# Patient Record
Sex: MALE | Race: WHITE | Employment: UNEMPLOYED | URBAN - METROPOLITAN AREA
[De-identification: names, ages, dates, MRNs, and addresses within clinical notes are randomized per-mention and may not be internally consistent; named-entity substitution may affect disease eponyms.]

---

## 2021-08-13 ENCOUNTER — HOSPITAL ENCOUNTER (INPATIENT)
Age: 31
LOS: 4 days | Discharge: HOME | DRG: 751 | End: 2021-08-17
Attending: EMERGENCY MEDICINE | Admitting: PSYCHIATRY & NEUROLOGY
Payer: MEDICAID

## 2021-08-13 DIAGNOSIS — R45.851 SUICIDAL IDEATION: Primary | ICD-10-CM

## 2021-08-13 PROBLEM — F32.A DEPRESSION: Status: ACTIVE | Noted: 2021-08-13

## 2021-08-13 LAB
A/G RATIO: 1.3 (ref 1.1–2.2)
ACETAMINOPHEN LEVEL: <5 UG/ML (ref 10–30)
ALBUMIN SERPL-MCNC: 4.4 G/DL (ref 3.4–5)
ALP BLD-CCNC: 77 U/L (ref 40–129)
ALT SERPL-CCNC: 18 U/L (ref 10–40)
AMPHETAMINE SCREEN, URINE: ABNORMAL
ANION GAP SERPL CALCULATED.3IONS-SCNC: 14 MMOL/L (ref 3–16)
AST SERPL-CCNC: 16 U/L (ref 15–37)
BARBITURATE SCREEN URINE: ABNORMAL
BASOPHILS ABSOLUTE: 0.2 K/UL (ref 0–0.2)
BASOPHILS RELATIVE PERCENT: 1.2 %
BENZODIAZEPINE SCREEN, URINE: ABNORMAL
BILIRUB SERPL-MCNC: 0.3 MG/DL (ref 0–1)
BUN BLDV-MCNC: 13 MG/DL (ref 7–20)
CALCIUM SERPL-MCNC: 9.2 MG/DL (ref 8.3–10.6)
CANNABINOID SCREEN URINE: POSITIVE
CHLORIDE BLD-SCNC: 104 MMOL/L (ref 99–110)
CO2: 20 MMOL/L (ref 21–32)
COCAINE METABOLITE SCREEN URINE: ABNORMAL
CREAT SERPL-MCNC: 0.7 MG/DL (ref 0.9–1.3)
EOSINOPHILS ABSOLUTE: 0.4 K/UL (ref 0–0.6)
EOSINOPHILS RELATIVE PERCENT: 2.5 %
ETHANOL: 12 MG/DL (ref 0–0.08)
GFR AFRICAN AMERICAN: >60
GFR NON-AFRICAN AMERICAN: >60
GLOBULIN: 3.3 G/DL
GLUCOSE BLD-MCNC: 104 MG/DL (ref 70–99)
HCT VFR BLD CALC: 42.9 % (ref 40.5–52.5)
HEMOGLOBIN: 14.4 G/DL (ref 13.5–17.5)
LYMPHOCYTES ABSOLUTE: 4.6 K/UL (ref 1–5.1)
LYMPHOCYTES RELATIVE PERCENT: 30 %
Lab: ABNORMAL
MCH RBC QN AUTO: 28.1 PG (ref 26–34)
MCHC RBC AUTO-ENTMCNC: 33.6 G/DL (ref 31–36)
MCV RBC AUTO: 83.7 FL (ref 80–100)
METHADONE SCREEN, URINE: ABNORMAL
MONOCYTES ABSOLUTE: 1 K/UL (ref 0–1.3)
MONOCYTES RELATIVE PERCENT: 6.7 %
NEUTROPHILS ABSOLUTE: 9.1 K/UL (ref 1.7–7.7)
NEUTROPHILS RELATIVE PERCENT: 59.6 %
OPIATE SCREEN URINE: ABNORMAL
OXYCODONE URINE: ABNORMAL
PDW BLD-RTO: 15 % (ref 12.4–15.4)
PH UA: 5
PHENCYCLIDINE SCREEN URINE: ABNORMAL
PLATELET # BLD: 275 K/UL (ref 135–450)
PMV BLD AUTO: 8.6 FL (ref 5–10.5)
POTASSIUM REFLEX MAGNESIUM: 4.2 MMOL/L (ref 3.5–5.1)
PROPOXYPHENE SCREEN: ABNORMAL
RBC # BLD: 5.12 M/UL (ref 4.2–5.9)
SALICYLATE, SERUM: <0.3 MG/DL (ref 15–30)
SARS-COV-2, NAAT: NOT DETECTED
SODIUM BLD-SCNC: 138 MMOL/L (ref 136–145)
TOTAL PROTEIN: 7.7 G/DL (ref 6.4–8.2)
WBC # BLD: 15.3 K/UL (ref 4–11)

## 2021-08-13 PROCEDURE — 1240000000 HC EMOTIONAL WELLNESS R&B

## 2021-08-13 PROCEDURE — 80143 DRUG ASSAY ACETAMINOPHEN: CPT

## 2021-08-13 PROCEDURE — 99285 EMERGENCY DEPT VISIT HI MDM: CPT

## 2021-08-13 PROCEDURE — 6370000000 HC RX 637 (ALT 250 FOR IP): Performed by: PSYCHIATRY & NEUROLOGY

## 2021-08-13 PROCEDURE — 80053 COMPREHEN METABOLIC PANEL: CPT

## 2021-08-13 PROCEDURE — 80179 DRUG ASSAY SALICYLATE: CPT

## 2021-08-13 PROCEDURE — 85025 COMPLETE CBC W/AUTO DIFF WBC: CPT

## 2021-08-13 PROCEDURE — 99223 1ST HOSP IP/OBS HIGH 75: CPT | Performed by: PSYCHIATRY & NEUROLOGY

## 2021-08-13 PROCEDURE — 80307 DRUG TEST PRSMV CHEM ANLYZR: CPT

## 2021-08-13 PROCEDURE — 99221 1ST HOSP IP/OBS SF/LOW 40: CPT | Performed by: PHYSICIAN ASSISTANT

## 2021-08-13 PROCEDURE — 82077 ASSAY SPEC XCP UR&BREATH IA: CPT

## 2021-08-13 PROCEDURE — 87635 SARS-COV-2 COVID-19 AMP PRB: CPT

## 2021-08-13 RX ORDER — ACETAMINOPHEN 325 MG/1
650 TABLET ORAL EVERY 4 HOURS PRN
Status: DISCONTINUED | OUTPATIENT
Start: 2021-08-13 | End: 2021-08-17 | Stop reason: HOSPADM

## 2021-08-13 RX ORDER — TRAZODONE HYDROCHLORIDE 100 MG/1
100 TABLET ORAL NIGHTLY
Status: DISCONTINUED | OUTPATIENT
Start: 2021-08-13 | End: 2021-08-17 | Stop reason: HOSPADM

## 2021-08-13 RX ORDER — OLANZAPINE 10 MG/1
10 INJECTION, POWDER, LYOPHILIZED, FOR SOLUTION INTRAMUSCULAR EVERY 4 HOURS PRN
Status: DISCONTINUED | OUTPATIENT
Start: 2021-08-13 | End: 2021-08-17 | Stop reason: HOSPADM

## 2021-08-13 RX ORDER — OLANZAPINE 5 MG/1
5 TABLET ORAL EVERY 4 HOURS PRN
Status: DISCONTINUED | OUTPATIENT
Start: 2021-08-13 | End: 2021-08-17 | Stop reason: HOSPADM

## 2021-08-13 RX ORDER — TRAZODONE HYDROCHLORIDE 50 MG/1
50 TABLET ORAL NIGHTLY PRN
Status: DISCONTINUED | OUTPATIENT
Start: 2021-08-13 | End: 2021-08-17 | Stop reason: HOSPADM

## 2021-08-13 RX ORDER — HYDROXYZINE PAMOATE 50 MG/1
50 CAPSULE ORAL 3 TIMES DAILY PRN
Status: DISCONTINUED | OUTPATIENT
Start: 2021-08-13 | End: 2021-08-17 | Stop reason: HOSPADM

## 2021-08-13 RX ORDER — IBUPROFEN 400 MG/1
400 TABLET ORAL EVERY 6 HOURS PRN
Status: DISCONTINUED | OUTPATIENT
Start: 2021-08-13 | End: 2021-08-17 | Stop reason: HOSPADM

## 2021-08-13 RX ADMIN — TRAZODONE HYDROCHLORIDE 100 MG: 100 TABLET ORAL at 21:36

## 2021-08-13 RX ADMIN — SERTRALINE HYDROCHLORIDE 50 MG: 50 TABLET ORAL at 21:37

## 2021-08-13 ASSESSMENT — SLEEP AND FATIGUE QUESTIONNAIRES
RESTFUL SLEEP: NO
DIFFICULTY FALLING ASLEEP: YES
DIFFICULTY STAYING ASLEEP: YES
DIFFICULTY ARISING: NO
DO YOU USE A SLEEP AID: NO
AVERAGE NUMBER OF SLEEP HOURS: 2
SLEEP PATTERN: DIFFICULTY FALLING ASLEEP;DISTURBED/INTERRUPTED SLEEP;INSOMNIA
DO YOU HAVE DIFFICULTY SLEEPING: YES

## 2021-08-13 ASSESSMENT — PATIENT HEALTH QUESTIONNAIRE - PHQ9
SUM OF ALL RESPONSES TO PHQ QUESTIONS 1-9: 25
SUM OF ALL RESPONSES TO PHQ QUESTIONS 1-9: 27

## 2021-08-13 ASSESSMENT — LIFESTYLE VARIABLES: HISTORY_ALCOHOL_USE: YES
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
## 2021-08-13 NOTE — PROGRESS NOTES
Ambrocio Noguera ...   [facility]  Admission Note     Admission Type:   Admission Type: Voluntary    Reason for admission:  Reason for Admission: SI had a gun was going to kill himselfa friend found himandgot him to the hospital    PATIENT STRENGTHS:  Strengths: Employment, No significant Physical Illness    Patient Strengths and Limitations:  Limitations: Tendency to isolate self, External locus of control, Difficulty problem solving/relies on others to help solve problems, Difficult relationships / poor social skills, Hopeless about future, General negative or hopeless attitude about future/recovery    Addictive Behavior:   Addictive Behavior  In the past 3 months, have you felt or has someone told you that you have a problem with:  : None  Do you have a history of Chemical Use?: Yes  Do you have a history of Alcohol Use?: Yes (stopped drinking 7 years ago)  Do you have a history of Street Drug Abuse?: Yes  Histroy of Prescripton Drug Abuse?: Yes (opiates stopped 6 years ago)    Medical Problems:   History reviewed. No pertinent past medical history. Status EXAM:  Status and Exam  Normal: No  Facial Expression: Avoids Gaze  Affect: Blunt  Level of Consciousness: Lethargic  Mood:Normal: No  Mood: Depressed  Motor Activity:Normal: No  Motor Activity: Decreased  Interview Behavior: Cooperative  Preception: [place] to Person, Shyrl Plump to Time, [place] to Place, [place] to Situation  Attention:Normal: Yes  Attention: Unable to Concentrate  Thought Processes: Other(See comment) (WNL)  Thought Content:Normal: Yes  Hallucinations: None  Delusions: No  Memory:Normal: Yes  Insight and Judgment: No  Insight and Judgment: Poor Judgment, Poor Insight  Present Suicidal Ideation: No  Present Homicidal Ideation: No    Tobacco Screening:  Practical Counseling, on admission, juanjo X, if applicable and completed (first 3 are required if patient doesn't refuse):             ( X)  Recognizing danger situations (included triggers and roadblocks)                    ( X)  Coping skills (new ways to manage stress, exercise, relaxation techniques, changing routine, distraction)                                                           (X )  Basic information about quitting (benefits of quitting, techniques in how to quit, available resources  ( ) Referral for counseling faxed to Jon                                           ( ) Patient refused counseling  ( ) Patient has not smoked in the last 30 days    Metabolic Screening:    No results found for: LABA1C    No results found for: CHOL  No results found for: TRIG  No results found for: HDL  No components found for: LDLCAL  No results found for: LABVLDL      Body mass index is 38.35 kg/m². BP Readings from Last 2 Encounters:   08/13/21 119/72           Pt admitted with followings belongings:  Dentures: None  Vision - Corrective Lenses: None  Hearing Aid: None  Jewelry: None  Body Piercings Removed: N/A  Clothing: Pants, Shirt, Other (Comment) (belt)  Were All Patient Medications Collected?: Not Applicable  Other Valuables: Cell phone      Patient oriented to surroundings and program expectations and copy of patient rights given. Received admission packet:  YES. Consents reviewed, signed YES. Refused NO. Patient verbalize understanding:  yes.   Patient education on precautions: yes                   Isamar Sol RN

## 2021-08-13 NOTE — PLAN OF CARE
Problem: Depressive Behavior With or Without Suicide Precautions:  Goal: Able to verbalize and/or display a decrease in depressive symptoms  Description: Able to verbalize and/or display a decrease in depressive symptoms  Outcome: Ongoing     Problem: Depressive Behavior With or Without Suicide Precautions:  Goal: Ability to disclose and discuss suicidal ideas will improve  Description: Ability to disclose and discuss suicidal ideas will improve  Outcome: Ongoing     Problem: Depressive Behavior With or Without Suicide Precautions:  Goal: Absence of self-harm  Description: Absence of self-harm  Outcome: Ongoing   Patient verbalized that he still wishes that his friend had not interrupted his SI attempt. Patient did not verbalize thoughts to harm himself at this time. Patient stated that he has had a \"bad year\". He verbalized that his brother , grandpa  and his grandmother is sick. He verbalized that he has been getting more depressed to the point of not going to his job. Patient verbalized that today was the most he had talked about his problems so far. Patient encouragd to be on the milieu and try to spend less time in his room. Patient showered and was briefly on the milieu, no interaction with peers.
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
## 2021-08-13 NOTE — ED NOTES
Level of Care Disposition: Admit  Patient was seen by ED provider and [facility] staff. The case presented to psychiatric provider on-call, Dr. Zara Adhikari. Based on the ED evaluation and information presented to the provider by Shivani Burdick, it is the recommendation of the on call psychiatric provider that inpatient hospitalization is the least restrictive environment for the patient at this time. The patient will be admitted to the inpatient unit. Admitting provider did not order suicide precautions based on patient verbally lorna for safety while inpatient.   States he does not intend to harm self while at the hospital..           Sherrie Mesa [place]  08/13/21 8335

## 2021-08-13 NOTE — ED NOTES
Telephone report provided to Nithya Pelletier RN. Patient resting in assigned room, no outward s/s distress noted. Monitored for safety.      Hamida Johnson RN  08/13/21 8697

## 2021-08-13 NOTE — ED PROVIDER NOTES
[address] [facility]  EMERGENCY DEPARTMENT ENCOUNTER      Pt Name: Pao Knight  MRN: [id number]  [place]am[place] 1990  Date of evaluation: 8/13/2021  Provider: Luisana Regan MD    CHIEF COMPLAINT       Chief Complaint   Patient presents with    Psychiatric Evaluation     states hearing voices and they are telling him to kill himself or that everybody will be better off if he were dead. states has been hearing them for aprox 10 years but has never seen anyone for this         HISTORY OF PRESENT ILLNESS   (Location/Symptom, Timing/Onset, Context/Setting, Quality, Duration, Modifying Factors, Severity)  Note limiting factors. Pao Knight is a 26 y.o. male with past medical history of no significant illness here today for suicidal ideation    The patient states that he has a longtime history of feeling depressed and feeling suicidal.  He states he has tried cutting himself in the past but it is always been superficial and is never needed to her receive attention. States he is never seen a physician for this. He has never been formally diagnosed with any psychiatric illness but states he is always felt depressed. He notes that the symptoms have been coming more increased as of late. He states he is now hearing voices that are telling him to kill himself. He notes that he has a gun at home and was going to shoot himself tonight until he was stopped by his friend. States he feels very tired not been sleeping well. Denies any ingestions. Admits to smoking tobacco products and marijuana but no other substance abuse. Denies any headache. No numbness, tingling or weakness. No recent personality changes. States he has had the symptoms for nearly his whole life and they have slowly been worsening    HPI    Nursing Notes were reviewed.     REVIEW OF SYSTEMS    (2-9 systems for level 4, 10 or more for level 5)     Review of Systems    Please see HPI for pertinent positive and negative review of system findings. A full 10 system ROS was performed and otherwise negative. PAST MEDICAL HISTORY   History reviewed. No pertinent past medical history. SURGICAL HISTORY       Past Surgical History:   Procedure Laterality Date    ADENOIDECTOMY      APPENDECTOMY      TONSILLECTOMY           CURRENT MEDICATIONS       Previous Medications    No medications on file       ALLERGIES     Patient has no known allergies. FAMILY HISTORY     History reviewed. No pertinent family history. SOCIAL HISTORY       Social History     Socioeconomic History    Marital status:      Spouse name: None    Number of children: None    Years of education: None    Highest education level: None   Occupational History    None   Tobacco Use    Smoking status: Current Every Day Smoker     Packs/day: 1.00   Substance and Sexual Activity    Alcohol use: Yes     Comment: approx every 6 months    Drug use: Yes     Types: Marijuana     Comment: last smoked approx 48 hours ago    Sexual activity: None   Other Topics Concern    None   Social History Narrative    None     Social Determinants of Health     Financial Resource Strain:     Difficulty of Paying Living Expenses:    Food Insecurity:     Worried About Running Out of Food in the Last Year:     Ran Out of Food in the Last Year:    Transportation Needs:     Lack of Transportation (Medical):      Lack of Transportation (Non-Medical):    Physical Activity:     Days of Exercise per Week:     Minutes of Exercise per Session:    Stress:     Feeling of Stress :    Social Connections:     Frequency of Communication with Friends and Family:     Frequency of Social Gatherings with Friends and Family:     Attends Anabaptism Services:     Active Member of Clubs or Organizations:     Attends Club or Organization Meetings:     Marital Status:    Intimate Partner Violence:     Fear of Current or Ex-Partner:     Emotionally Abused:     Physically Abused:     Sexually Abused:        SCREENINGS               PHYSICAL EXAM    (up to 7 for level 4, 8 or more for level 5)     ED Triage Vitals [08/13/21 0215]   BP Temp Temp Source Pulse Resp SpO2 Height Weight   (!) 145/92 97.1 °F (36.2 °C) Oral 64 16 99 % 5' 11\" (1.803 m) 275 lb (124.7 kg)       Physical Exam    General appearance:  Cooperative. Tearful  Skin:  Warm. Dry. Eye:  Extraocular movements intact. Ears, nose, mouth and throat:  Oral mucosa moist,  Neck:  Trachea midline. Heart:  Regular rate and rhythm  Perfusion:  intact  Respiratory:  Lungs clear to auscultation bilaterally. Respirations nonlabored. Abdominal:   Non distended. Nontender  Neurological:  Alert and oriented x 3. Moves all extremities spontaneously  Musculoskeletal:   Normal ROM, no deformities          Psychiatric: Tearful with depressed mood. Endorses auditory hallucinations      DIAGNOSTIC RESULTS       Labs Reviewed   COVID-19, RAPID   COVID-19, RAPID   CBC WITH AUTO DIFFERENTIAL   COMPREHENSIVE METABOLIC PANEL W/ REFLEX TO MG FOR LOW K   URINE DRUG SCREEN   SALICYLATE LEVEL   ACETAMINOPHEN LEVEL   ETHANOL       Interpretation per the Radiologist below, if obtained/available at the time of this note:    No orders to display       All other labs/imaging were within normal range or not returned as of this dictation. EMERGENCY DEPARTMENT COURSE and DIFFERENTIAL DIAGNOSIS/MDM:   Vitals:    Vitals:    08/13/21 0215   BP: (!) 145/92   Pulse: 64   Resp: 16   Temp: 97.1 °F (36.2 °C)   TempSrc: Oral   SpO2: 99%   Weight: 275 lb (124.7 kg)   Height: 5' 11\" (1.803 m)       Patient presents emergency department today with intermittent auditory hallucinations, worsening depression and thoughts of killing himself. States he was going to shoot himself with a gun. He has no formal psychiatric history but states he has been dealing with this for his entire life.   Nonfocal neurologic exam.  No complaints of headache or recent personality changes. Low suspicion for intracranial pathology. Laboratory work-up was unremarkable here do feel the patient is medically clear for psychiatric evaluation. Given his active suicidal ideations and having a gun at home suspect he likely will require admission to the hospital    MDM    CONSULTS     None    Critical Care:   None    REASSESSMENT          PROCEDURE     Unless otherwise noted below, none     Procedures      FINAL IMPRESSION      1. Suicidal ideation            DISPOSITION/PLAN   DISPOSITION Ed Observation 08/13/2021 02:36:51 AM        PATIENT REFERRED TO:  No follow-up provider specified. DISCHARGE MEDICATIONS:  New Prescriptions    No medications on file     Controlled Substances Monitoring:     No flowsheet data found.     (Please note that portions of this note were completed with a voice recognition program.  Efforts were made to edit the dictations but occasionally words are mis-transcribed.)    Mo Langston MD (electronically signed)  Attending Emergency Physician            Blaire Lezama MD  08/13/21 3621

## 2021-08-13 NOTE — PROGRESS NOTES
Patient arrived to [facility] from [facility] with two hospital staff in attendance. He denied SI/HI,and was able to contract for safety.

## 2021-08-13 NOTE — H&P
Hospital Medicine History & Physical      PCP: No primary care provider on file. Date of Admission: 8/13/2021    Date of Service: Pt seen/examined on 8/13/2021   Chief Complaint:    Chief Complaint   Patient presents with    Psychiatric Evaluation     states hearing voices and they are telling him to kill himself or that everybody will be better off if he were dead. states has been hearing them for aprox 10 years but has never seen anyone for this         History Of Present Illness: The patient is a 26 y.o. male who presented to [facility] ER for SI. Patient was seen and evaluated in the ED by the ED medical provider, patient was medically cleared for admission to [facility] at [facility]. This note serves as an admission medical H&P. Tobacco use: yes  ETOH use:denies   Illicit drug use: UDS+ THC    Patient denies any medical complaints     Past Medical History:    History reviewed. No pertinent past medical history. Past Surgical History:        Procedure Laterality Date    ADENOIDECTOMY      APPENDECTOMY      TONSILLECTOMY         Medications Prior to Admission:    Prior to Admission medications    Not on File       Allergies:  Patient has no known allergies. Social History:  The patient currently lives with friends     TOBACCO:   reports that he has been smoking. He started smoking about 24 years ago. He has been smoking about 1.00 pack per day. He has never used smokeless tobacco.  ETOH:   reports current alcohol use. Family History:   Positive as follows:    History reviewed. No pertinent family history.     REVIEW OF SYSTEMS:       Constitutional: Negative for fever   HENT: Negative for sore throat   Eyes: Negative for redness   Respiratory: Negative  for dyspnea, cough   Cardiovascular: Negative for chest pain   Gastrointestinal: Negative for vomiting, diarrhea   Genitourinary: Negative for hematuria   Musculoskeletal: Negative for arthralgias   Skin: Negative for rash   Neurological: Negative for syncope    Hematological: Negative for easy bruising/bleeding   Psychiatric/Behavorial: Per psychiatry team evaluation     PHYSICAL EXAM:    /72   Pulse 51   Temp 97.3 °F (36.3 °C) (Temporal)   Resp 16   Ht 5' 11\" (1.803 m)   Wt 275 lb (124.7 kg)   SpO2 97%   BMI 38.35 kg/m²   Gen: No distress. Alert. Eyes: PERRL. No sclera icterus. No conjunctival injection. ENT: No discharge. Pharynx clear. Neck: No JVD. Trachea midline. Resp: No accessory muscle use. No crackles. No wheezes. No rhonchi. CV: Regular rate. Regular rhythm. No murmur. No rub. No edema. GI: Non-tender. Non-distended. Normal bowel sounds. Skin: Warm and dry. No nodule on exposed extremities. No rash on exposed extremities. M/S: No cyanosis. No joint deformity. No clubbing. Neuro: Awake. No focal neurologic deficit on exam.  Cranial nerves II through XII intact. Patient is able to ambulate without difficulty. Psych: Per psychiatry team evaluation     CBC:   Recent Labs     08/13/21 0232   WBC 15.3*   HGB 14.4   HCT 42.9   MCV 83.7        BMP:   Recent Labs     08/13/21 0232      K 4.2      CO2 20*   BUN 13   CREATININE 0.7*     LIVER PROFILE:   Recent Labs     08/13/21 0232   AST 16   ALT 18   BILITOT 0.3   ALKPHOS 77     PT/INR: No results for input(s): PROTIME, INR in the last 72 hours. APTT: No results for input(s): APTT in the last 72 hours. UA:  Recent Labs     08/13/21 0245   PHUR 5.0         ASSESSMENT/PLAN:  #Depression  - per psychiatry team    #Leucocytosis  - without apparent infection. Suspect reactive.   Repeat CBC in AM    Markel Villaseñor PA-C  8/13/2021 12:57 PM
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
## 2021-08-14 PROCEDURE — 6370000000 HC RX 637 (ALT 250 FOR IP): Performed by: PSYCHIATRY & NEUROLOGY

## 2021-08-14 PROCEDURE — 99233 SBSQ HOSP IP/OBS HIGH 50: CPT | Performed by: PSYCHIATRY & NEUROLOGY

## 2021-08-14 PROCEDURE — 1240000000 HC EMOTIONAL WELLNESS R&B

## 2021-08-14 RX ORDER — ARIPIPRAZOLE 10 MG/1
5 TABLET ORAL DAILY
Status: DISCONTINUED | OUTPATIENT
Start: 2021-08-14 | End: 2021-08-17 | Stop reason: HOSPADM

## 2021-08-14 RX ADMIN — SERTRALINE HYDROCHLORIDE 50 MG: 50 TABLET ORAL at 09:39

## 2021-08-14 RX ADMIN — HYDROXYZINE PAMOATE 50 MG: 50 CAPSULE ORAL at 09:41

## 2021-08-14 RX ADMIN — OLANZAPINE 5 MG: 5 TABLET, FILM COATED ORAL at 12:37

## 2021-08-14 RX ADMIN — NICOTINE POLACRILEX 4 MG: 4 GUM, CHEWING BUCCAL at 19:09

## 2021-08-14 RX ADMIN — ARIPIPRAZOLE 5 MG: 10 TABLET ORAL at 12:38

## 2021-08-14 RX ADMIN — TRAZODONE HYDROCHLORIDE 100 MG: 100 TABLET ORAL at 20:39

## 2021-08-14 NOTE — PROGRESS NOTES
...Purposeful Rounding    Patient Location: Patient room    Patient willing to engage in conversation: No    Presentation/behavior: sleeping    Affect: sleeping    Concerns reported: no    PRN medications given: no    Environmental assessment: Room free from clutter, Clear path to bathroom  and Adequate lighting    Fall prevention interventions in place: Yellow non-skid socks on    Daily [place] Fall Risk Score: 40    Daily Dang Fall Risk Score: 0

## 2021-08-14 NOTE — FLOWSHEET NOTE
Purposeful Rounding     Patient Location: Dining room     Patient willing to engage in conversation: yes     Presentation/behavior:  quiet and cooperative  Affect: awake and alert  Concerns reported: no     PRN medications given: no     Environmental assessment: Room free from clutter, Clear path to bathroom  and Adequate lighting

## 2021-08-14 NOTE — BH NOTE
Purposeful Rounding    Patient Location: Patient room    Patient willing to engage in conversation: Yes    Presentation/behavior: Cooperative and Pleasant    Affect: Brightens with interaction    Concerns reported:  none    PRN medications given:  none    Environmental assessment: Room free from clutter and Clear path to bathroom     Fall prevention interventions in place: yellow socks     Daily [place] Fall Risk Scored:  40     Daily Dang Fall Risk Score:       0

## 2021-08-14 NOTE — PLAN OF CARE
Problem: Depressive Behavior With or Without Suicide Precautions:  Goal: Ability to disclose and discuss suicidal ideas will improve  Description: Ability to disclose and discuss suicidal ideas will improve  8/13/2021 2133 by Suad Gallagher RN  Outcome: Ongoing   Dennis Jamison spent most of the evening in his room. He did not attend any of the evening groups. Dennis Jamison did come out to the day room to eat his hs snack but he sat at a table by himself and did not interact with peers. Dennis Jamison presents with a depressed mood and flat affect. Dennis Jamison, during 1:1 care interview, stated that he was having fleeting suicidal thoughts this morning. He stated that he has not had any suicidal thoughts during the afternoon or evening. Dennis Jamison was able to contract for safety. Dennis Jamison was compliant with his  medications. He took trazodone and zoloft. Medication education done verbally.

## 2021-08-14 NOTE — BH NOTE
Pt tearful, reports that he was reading a book that was very graphic and violent and even though he has stopped reading the book he keeps seeing it in his mind and it is very distressing for him. He is visibly shaking. Medicated with Zyprexa 5mg.

## 2021-08-14 NOTE — PLAN OF CARE
Problem: Depressive Behavior With or Without Suicide Precautions:  Goal: Able to verbalize acceptance of life and situations over which he or she has no control  Description: Able to verbalize acceptance of life and situations over which he or she has no control  Outcome: Ongoing     Problem: Depressive Behavior With or Without Suicide Precautions:  Goal: Able to verbalize acceptance of life and situations over which he or she has no control  Description: Able to verbalize acceptance of life and situations over which he or she has no control  Outcome: Ongoing  Goal: Able to verbalize and/or display a decrease in depressive symptoms  Description: Able to verbalize and/or display a decrease in depressive symptoms  Outcome: Ongoing  Goal: Ability to disclose and discuss suicidal ideas will improve  Description: Ability to disclose and discuss suicidal ideas will improve  8/14/2021 0950 by Danielle Miguel RN  Outcome: Ongoing  8/13/2021 2133 by Sanchez Maher RN  Outcome: Ongoing  Goal: Able to verbalize support systems  Description: Able to verbalize support systems  Outcome: Ongoing  Goal: Absence of self-harm  Description: Absence of self-harm  Outcome: Ongoing   [facility] is visible in the milieu, he is cooperative with care, denies SI,HI or AVH. Med and diet compliant.

## 2021-08-14 NOTE — PROGRESS NOTES
Purposeful Rounding     Patient Location: Patient room     Patient willing to engage in conversation: No     Presentation/behavior: sleeping     Affect: sleeping     Concerns reported: no     PRN medications given: no     Environmental assessment: Room free from clutter, Clear path to bathroom  and Adequate lighting

## 2021-08-14 NOTE — PROGRESS NOTES
Purposeful Rounding     Patient Location: Patient room     Patient willing to engage in conversation: No     Presentation/behavior: sleeping     Affect: sleeping     Concerns reported: no     PRN medications given: no     Environmental assessment: Room free from clutter, Clear path to bathroom  and Adequate lighting     Fall prevention interventions in place: Yellow non-skid socks on     Daily [place] Fall Risk Score: 40     Daily Dang Fall Risk Score: 0

## 2021-08-15 PROCEDURE — 1240000000 HC EMOTIONAL WELLNESS R&B

## 2021-08-15 PROCEDURE — 6370000000 HC RX 637 (ALT 250 FOR IP): Performed by: PSYCHIATRY & NEUROLOGY

## 2021-08-15 RX ADMIN — SERTRALINE HYDROCHLORIDE 50 MG: 50 TABLET ORAL at 08:54

## 2021-08-15 RX ADMIN — ARIPIPRAZOLE 5 MG: 10 TABLET ORAL at 08:53

## 2021-08-15 RX ADMIN — TRAZODONE HYDROCHLORIDE 50 MG: 50 TABLET ORAL at 23:53

## 2021-08-15 RX ADMIN — HYDROXYZINE PAMOATE 50 MG: 50 CAPSULE ORAL at 23:55

## 2021-08-15 RX ADMIN — TRAZODONE HYDROCHLORIDE 100 MG: 100 TABLET ORAL at 21:48

## 2021-08-15 NOTE — CARE COORDINATION
[facility]  Treatment Team Note  Review Date & Time: 08/15/21 0930    Patient was not in treatment team      Status EXAM:   Status and Exam  Normal: Yes  Facial Expression: Brightened  Affect: Congruent  Level of Consciousness: Alert  Mood:Normal: No  Mood: Depressed  Motor Activity:Normal: No  Motor Activity: Decreased  Interview Behavior: Cooperative  Preception: [place] to Person, [place] to Time, [place] to Place, [place] to Situation  Attention:Normal: Yes  Attention: Unable to Concentrate  Thought Processes: Other(See comment) (linear)  Thought Content:Normal: Yes  Hallucinations: None  Delusions: No  Memory:Normal: Yes  Insight and Judgment: No  Insight and Judgment: Poor Insight  Present Suicidal Ideation: No  Present Homicidal Ideation: No      Suicide Risk CSSR-S:  1) Within the past month, have you wished you were dead or wished you could go to sleep and not wake up? : Yes  2) Have you actually had any thoughts of killing yourself? : Yes  3) Have you been thinking about how you might kill yourself? : Yes  5) Have you started to work out or worked out the details of how to kill yourself? Do you intend to carry out this plan? : Yes  6) Have you ever done anything, started to do anything, or prepared to do anything to end your life?: Yes      PLAN/TREATMENT RECOMMENDATIONS UPDATE: Patient will take medication as prescribed, eat 75% of meals, attend groups, participate in milieu activities, participate in treatment team and care planning for discharge and follow up.           Maile Ness RN

## 2021-08-15 NOTE — PROGRESS NOTES
Purposeful Rounding     Patient Location: Patient room     Patient willing to engage in conversation: No     Presentation/behavior: Other Asleep*     Affect: Neutral/Euthymic(normal)     Concerns reported:  No     PRN medications given: No     Environmental assessment: Room free from clutter, Clear path to bathroom  and No safety hazards noted     Fall prevention interventions in place: N/A     Daily [place] Fall Risk Score: 53     Daily Dang Fall Risk Score: 0
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
## 2021-08-15 NOTE — PROGRESS NOTES
Purposeful Rounding    Patient Location: Patient room    Patient willing to engage in conversation: No    Presentation/behavior: Other Asleep*    Affect: Neutral/Euthymic(normal)    Concerns reported: No    PRN medications given: No    Environmental assessment: Room free from clutter, Clear path to bathroom  and No safety hazards noted    Fall prevention interventions in place: N/A    Daily [place] Fall Risk Score: 53    Daily Dang Fall Risk Score: 0

## 2021-08-15 NOTE — GROUP NOTE
Group Therapy Note    Date: 8/14/2021    Group Start Time: 7:45 PM  Group End Time: 8:45 PM   Group Topic: [address] DYLAN Dash        Group Therapy Note    Attendees: 9       Patient's Goal: Go to group at 11 or 4. Notes:  Met goal.    Status After Intervention:  Improved    Participation Level:  Active Listener and Interactive    Participation Quality: Appropriate, Attentive, Sharing and Supportive      Speech:  normal      Thought Process/Content: Logical      Affective Functioning: Congruent      Mood: euthymic      Level of consciousness:  Alert, Oriented x4 and Attentive      Response to Learning: Able to verbalize current knowledge/experience, Able to verbalize/acknowledge new learning and Progressing to goal      Endings: None Reported    Modes of Intervention: Socialization      Discipline Responsible: /Counselor      Signature:  DYLAN Tang

## 2021-08-15 NOTE — PROGRESS NOTES
Department of Psychiatry  Attending Progress Note  Chief Complaint: follow-up depression and si  Patient's chart was reviewed and collaborated with  about the treatment plan. SUBJECTIVE:    Patient is feeling better. Suicidal ideation:  passive. Patient does not have medication side effects. Pt stated that he is feeling better and he has been out in the unit. Pt did get overwhelmed since the book he picked from [employer] was talking about suicide note. He kindly gave me book and stated that it might be better to read something else. Pt stated that still have depression but feeling somewhat hopeful especially after talking to mom and having a good conversation with her. He denies voices today. ROS: Patient has new complaints: no  Sleeping adequately:  Yes   Appetite adequate: Yes  Attending groups: No:   Visitors:No    OBJECTIVE    Physical  VITALS:  /70   Pulse 71   Temp 97.7 °F (36.5 °C) (Temporal)   Resp 18   Ht 5' 11\" (1.803 m)   Wt 275 lb (124.7 kg)   SpO2 98%   BMI 38.35 kg/m²     Mental Status Examination:  Patients appearance was well-appearing, street clothes, in chair, good grooming and good hygiene. Thoughts are Logical. Homicidal ideations none. No abnormal movements, tics or mannerisms. Memory intact Aims 0. Concentration Good. Alert and oriented X 4. Insight and Judgement limited. Patient was cooperative. Patient gait normal. Mood depressed, affect depressed affect Hallucinations Absent, suicidal ideations general plan to harm self Speech fluent and spontaenous.     Data  Labs:   Admission on 08/13/2021   Component Date Value Ref Range Status    WBC 08/13/2021 15.3* 4.0 - 11.0 K/uL Final    RBC 08/13/2021 5.12  4.20 - 5.90 M/uL Final    Hemoglobin 08/13/2021 14.4  13.5 - 17.5 g/dL Final    Hematocrit 08/13/2021 42.9  40.5 - 52.5 % Final    MCV 08/13/2021 83.7  80.0 - 100.0 fL Final    MCH 08/13/2021 28.1  26.0 - 34.0 pg Final    MCHC 08/13/2021 33.6  31.0 - 36.0 g/dL Final    RDW 08/13/2021 15.0  12.4 - 15.4 % Final    Platelets 75/46/3143 275  135 - 450 K/uL Final    MPV 08/13/2021 8.6  5.0 - 10.5 fL Final    Neutrophils % 08/13/2021 59.6  % Final    Lymphocytes % 08/13/2021 30.0  % Final    Monocytes % 08/13/2021 6.7  % Final    Eosinophils % 08/13/2021 2.5  % Final    Basophils % 08/13/2021 1.2  % Final    Neutrophils Absolute 08/13/2021 9.1* 1.7 - 7.7 K/uL Final    Lymphocytes Absolute 08/13/2021 4.6  1.0 - 5.1 K/uL Final    Monocytes Absolute 08/13/2021 1.0  0.0 - 1.3 K/uL Final    Eosinophils Absolute 08/13/2021 0.4  0.0 - 0.6 K/uL Final    Basophils Absolute 08/13/2021 0.2  0.0 - 0.2 K/uL Final    Sodium 08/13/2021 138  136 - 145 mmol/L Final    Potassium reflex Magnesium 08/13/2021 4.2  3.5 - 5.1 mmol/L Final    Chloride 08/13/2021 104  99 - 110 mmol/L Final    CO2 08/13/2021 20* 21 - 32 mmol/L Final    Anion Gap 08/13/2021 14  3 - 16 Final    Glucose 08/13/2021 104* 70 - 99 mg/dL Final    BUN 08/13/2021 13  7 - 20 mg/dL Final    CREATININE 08/13/2021 0.7* 0.9 - 1.3 mg/dL Final    GFR Non- 08/13/2021 >60  >60 Final    Comment: >60 mL/min/1.73m2 EGFR, calc. for ages 25 and older using the  MDRD formula (not corrected for weight), is valid for stable  renal function.  GFR  08/13/2021 >60  >60 Final    Comment: Chronic Kidney Disease: less than 60 ml/min/1.73 sq.m. Kidney Failure: less than 15 ml/min/1.73 sq.m. Results valid for patients 18 years and older.       Calcium 08/13/2021 9.2  8.3 - 10.6 mg/dL Final    Total Protein 08/13/2021 7.7  6.4 - 8.2 g/dL Final    Albumin 08/13/2021 4.4  3.4 - 5.0 g/dL Final    Albumin/Globulin Ratio 08/13/2021 1.3  1.1 - 2.2 Final    Total Bilirubin 08/13/2021 0.3  0.0 - 1.0 mg/dL Final    Alkaline Phosphatase 08/13/2021 77  40 - 129 U/L Final    ALT 08/13/2021 18  10 - 40 U/L Final    AST 08/13/2021 16  15 - 37 U/L Final    Globulin 08/13/2021 3.3  g/dL Final    Amphetamine Screen, Urine 08/13/2021 Neg  Negative <1000ng/mL Final    Barbiturate Screen, Ur 08/13/2021 Neg  Negative <200 ng/mL Final    Benzodiazepine Screen, Urine 08/13/2021 Neg  Negative <200 ng/mL Final    Cannabinoid Scrn, Ur 08/13/2021 POSITIVE* Negative <50 ng/mL Final    Cocaine Metabolite Screen, Urine 08/13/2021 Neg  Negative <300 ng/mL Final    Opiate Scrn, Ur 08/13/2021 Neg  Negative <300 ng/mL Final    Comment: \"Therapeutic levels of pain medication, especially oxycontin and synthetic  opioids, may not be detected by this Methodology. Pain management screen  panel  Drug panel-PM-Hi Res Ur, Interp (PAIN) should be considered for drug  monitoring \".  PCP Screen, Urine 08/13/2021 Neg  Negative <25 ng/mL Final    Methadone Screen, Urine 08/13/2021 Neg  Negative <300 ng/mL Final    Propoxyphene Scrn, Ur 08/13/2021 Neg  Negative <300 ng/mL Final    Oxycodone Urine 08/13/2021 Neg  Negative <100 ng/ml Final    pH, UA 08/13/2021 5.0   Final    Comment: Urine pH less than 5.0 or greater than 8.0 may indicate sample adulteration. Another sample should be collected if clinically  indicated.  Drug Screen Comment: 08/13/2021 see below   Final    Comment: This method is a screening test to detect only these drug  classes as part of a medical workup. Confirmatory testing  by another method should be ordered if clinically indicated.       Salicylate, Serum 73/64/0422 <0.3* 15.0 - 30.0 mg/dL Final    Comment: Therapeutic Range: 15.0-30.0 mg/dL  Toxic: >30.0 mg/dL      Acetaminophen Level 08/13/2021 <5* 10 - 30 ug/mL Final    Comment: Therapeutic Range: 10.0-30.0 ug/mL  Toxic: >=150 ug/mL      Ethanol Lvl 08/13/2021 12  mg/dL Final    Comment:    None Detected  Conversion factor:  100 mg/dl = .100 g/dl  For Medical Purposes Only      SARS-CoV-2, NAAT 08/13/2021 Not Detected  Not Detected Final    Comment: Rapid NAAT:   Negative results should be treated as presumptive and,  if inconsistent with clinical signs and symptoms or necessary for  patient management, should be tested with an alternative molecular  assay. Negative results do not preclude SARS-CoV-2 infection and  should not be used as the sole basis for patient management decisions. This test has been authorized by the FDA under an Emergency Use  Authorization (EUA) for use by authorized laboratories. Fact sheet for Healthcare Providers:  Josephine.es  Fact sheet for Patients: [email or web address]    METHODOLOGY: Isothermal Nucleic Acid Amplification              Medications  Current Facility-Administered Medications: ARIPiprazole (ABILIFY) tablet 5 mg, 5 mg, Oral, Daily  acetaminophen (TYLENOL) tablet 650 mg, 650 mg, Oral, Q4H PRN  ibuprofen (ADVIL;MOTRIN) tablet 400 mg, 400 mg, Oral, Q6H PRN  magnesium hydroxide (MILK OF MAGNESIA) 400 MG/5ML suspension 30 mL, 30 mL, Oral, Daily PRN  hydrOXYzine (VISTARIL) capsule 50 mg, 50 mg, Oral, TID PRN  OLANZapine (ZYPREXA) tablet 5 mg, 5 mg, Oral, Q4H PRN **OR** OLANZapine (ZYPREXA) injection 10 mg, 10 mg, Intramuscular, Q4H PRN  sterile water injection 2.1 mL, 2.1 mL, Intramuscular, Q4H PRN  traZODone (DESYREL) tablet 50 mg, 50 mg, Oral, Nightly PRN  nicotine polacrilex (NICORETTE) gum 4 mg, 4 mg, Oral, PRN  sertraline (ZOLOFT) tablet 50 mg, 50 mg, Oral, Daily  traZODone (DESYREL) tablet 100 mg, 100 mg, Oral, Nightly    ASSESSMENT AND PLAN    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 Patient s symptoms   are improving cont zoloft and trazodone and add abilify  2. Probable discharge is next week  3. Discharge planning is incomplete  4 Suicidal ideation is better  5 I spent 35 minutes face to face and more than 50 % was spent coordinating care, exploring sx's and discussing pharmacotherapy

## 2021-08-15 NOTE — CARE COORDINATION
[facility]  Treatment Team Note  Review Date & Time: 8/13/21 0987    Patient was not in treatment team      Status EXAM:   Status and Exam  Normal: Yes  Facial Expression: Brightened  Affect: Congruent  Level of Consciousness: Alert  Mood:Normal: No  Mood: Depressed  Motor Activity:Normal: No  Motor Activity: Decreased  Interview Behavior: Cooperative  Preception: [place] to Person, [place] to Time, [place] to Place, [place] to Situation  Attention:Normal: Yes  Attention: Unable to Concentrate  Thought Processes: Other(See comment) (linear)  Thought Content:Normal: Yes  Hallucinations: None  Delusions: No  Memory:Normal: Yes  Insight and Judgment: No  Insight and Judgment: Poor Insight  Present Suicidal Ideation: No  Present Homicidal Ideation: No      Suicide Risk CSSR-S:  1) Within the past month, have you wished you were dead or wished you could go to sleep and not wake up? : Yes  2) Have you actually had any thoughts of killing yourself? : Yes  3) Have you been thinking about how you might kill yourself? : Yes  5) Have you started to work out or worked out the details of how to kill yourself? Do you intend to carry out this plan? : Yes  6) Have you ever done anything, started to do anything, or prepared to do anything to end your life?: Yes      PLAN/TREATMENT RECOMMENDATIONS UPDATE: Patient will take medication as prescribed, eat 75% of meals, attend groups, participate in milieu activities, participate in treatment team and care planning for discharge and follow up.           Wallace Estrada RN

## 2021-08-15 NOTE — GROUP NOTE
Group Therapy Note    Date: 8/15/2021    Group Start Time: 4:00 PM  Group End Time: 5:00 PM  Group Topic: Recreational    [address] [facility]        Group Therapy Note    Attendees: 10      Patient's Goal: Pt will utilize creativity, active listening, and achieve relaxation by participating in a \"[employer]". Notes:  Pt was participated in group and was appropriate. Pt had engaged minimal and encouraging conversation with peers. Pt met goal.     Status After Intervention:  Improved    Participation Level:  Active Listener and Interactive    Participation Quality: Appropriate, Attentive, Sharing and Supportive      Speech:  normal      Thought Process/Content: Logical  Linear      Affective Functioning: Congruent      Mood: euthymic      Level of consciousness:  Alert, Oriented x4 and Attentive      Response to Learning: Able to verbalize current knowledge/experience, Able to verbalize/acknowledge new learning and Progressing to goal      Endings: None Reported    Modes of Intervention: Activity      Discipline Responsible: /Counselor      Signature:  DYLAN Rincon

## 2021-08-15 NOTE — FLOWSHEET NOTE
Purposeful Rounding     Patient Location:Dining room     Patient willing to engage in conversation:yes     Presentation/behavior: awake, alert     Affect: flat     Concerns reported:  No     PRN medications given: No     Environmental assessment: Room free from clutter, Clear path to bathroom  and No safety hazards noted

## 2021-08-16 PROCEDURE — 6370000000 HC RX 637 (ALT 250 FOR IP): Performed by: PSYCHIATRY & NEUROLOGY

## 2021-08-16 PROCEDURE — 1240000000 HC EMOTIONAL WELLNESS R&B

## 2021-08-16 PROCEDURE — 97535 SELF CARE MNGMENT TRAINING: CPT

## 2021-08-16 PROCEDURE — 99233 SBSQ HOSP IP/OBS HIGH 50: CPT | Performed by: PSYCHIATRY & NEUROLOGY

## 2021-08-16 PROCEDURE — 97165 OT EVAL LOW COMPLEX 30 MIN: CPT

## 2021-08-16 RX ADMIN — SERTRALINE HYDROCHLORIDE 50 MG: 50 TABLET ORAL at 08:58

## 2021-08-16 RX ADMIN — TRAZODONE HYDROCHLORIDE 100 MG: 100 TABLET ORAL at 20:52

## 2021-08-16 RX ADMIN — ARIPIPRAZOLE 5 MG: 10 TABLET ORAL at 08:58

## 2021-08-16 NOTE — H&P
expected for age Thought content:  Reality based no evidence of delusions Abstraction: inatct  OCD: none   Insight: impaired insight Judgement: impaired judgment  Cognition:  oriented to person, place, and time  Fund of Knowledge: average   IQ: average Memory: intact  Suicide:  general plan to harm self Sleep: has difficulty falling asleep Appetite: ok     Inventory of strengths and weaknesses:Friend support  ROS:  See Medical H&PE    PE:  /68   Pulse 60   Temp 97.7 °F (36.5 °C) (Temporal)   Resp 16   Ht 5' 11\" (1.803 m)   Wt 275 lb (124.7 kg)   SpO2 97%   BMI 38.35 kg/m²     Cranial Nerves: 1-12 appear to be intact .   LAB:   Admission on 08/13/2021   Component Date Value Ref Range Status    WBC 08/13/2021 15.3* 4.0 - 11.0 K/uL Final    RBC 08/13/2021 5.12  4.20 - 5.90 M/uL Final    Hemoglobin 08/13/2021 14.4  13.5 - 17.5 g/dL Final    Hematocrit 08/13/2021 42.9  40.5 - 52.5 % Final    MCV 08/13/2021 83.7  80.0 - 100.0 fL Final    MCH 08/13/2021 28.1  26.0 - 34.0 pg Final    MCHC 08/13/2021 33.6  31.0 - 36.0 g/dL Final    RDW 08/13/2021 15.0  12.4 - 15.4 % Final    Platelets 13/77/7239 275  135 - 450 K/uL Final    MPV 08/13/2021 8.6  5.0 - 10.5 fL Final    Neutrophils % 08/13/2021 59.6  % Final    Lymphocytes % 08/13/2021 30.0  % Final    Monocytes % 08/13/2021 6.7  % Final    Eosinophils % 08/13/2021 2.5  % Final    Basophils % 08/13/2021 1.2  % Final    Neutrophils Absolute 08/13/2021 9.1* 1.7 - 7.7 K/uL Final    Lymphocytes Absolute 08/13/2021 4.6  1.0 - 5.1 K/uL Final    Monocytes Absolute 08/13/2021 1.0  0.0 - 1.3 K/uL Final    Eosinophils Absolute 08/13/2021 0.4  0.0 - 0.6 K/uL Final    Basophils Absolute 08/13/2021 0.2  0.0 - 0.2 K/uL Final    Sodium 08/13/2021 138  136 - 145 mmol/L Final    Potassium reflex Magnesium 08/13/2021 4.2  3.5 - 5.1 mmol/L Final    Chloride 08/13/2021 104  99 - 110 mmol/L Final    CO2 08/13/2021 20* 21 - 32 mmol/L Final    Anion Gap 08/13/2021 Urine 08/13/2021 Neg  Negative <100 ng/ml Final    pH, UA 08/13/2021 5.0   Final    Comment: Urine pH less than 5.0 or greater than 8.0 may indicate sample adulteration. Another sample should be collected if clinically  indicated.  Drug Screen Comment: 08/13/2021 see below   Final    Comment: This method is a screening test to detect only these drug  classes as part of a medical workup. Confirmatory testing  by another method should be ordered if clinically indicated.  Salicylate, Serum 46/16/5834 <0.3* 15.0 - 30.0 mg/dL Final    Comment: Therapeutic Range: 15.0-30.0 mg/dL  Toxic: >30.0 mg/dL      Acetaminophen Level 08/13/2021 <5* 10 - 30 ug/mL Final    Comment: Therapeutic Range: 10.0-30.0 ug/mL  Toxic: >=150 ug/mL      Ethanol Lvl 08/13/2021 12  mg/dL Final    Comment:    None Detected  Conversion factor:  100 mg/dl = .100 g/dl  For Medical Purposes Only      SARS-CoV-2, NAAT 08/13/2021 Not Detected  Not Detected Final    Comment: Rapid NAAT:   Negative results should be treated as presumptive and,  if inconsistent with clinical signs and symptoms or necessary for  patient management, should be tested with an alternative molecular  assay. Negative results do not preclude SARS-CoV-2 infection and  should not be used as the sole basis for patient management decisions. This test has been authorized by the FDA under an Emergency Use  Authorization (EUA) for use by authorized laboratories. Fact sheet for Healthcare Providers:  Terence  Fact sheet for Patients: Terence    METHODOLOGY: Isothermal Nucleic Acid Amplification           Formulation: Pt appears to be severely depressed with perceptual abn that appears related to mood sx's.     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 No pertinent past medical history. Axis 4: Problems with primary support group, Occupational problems, Economic problems and Other psychosocial and environmental problems      Tx plan:    prevent self injury, stabilize affect, restore sleep, treat depression, treat psychosis, establish/maintain aftercare. All conditions present on admission are being treated while pt is hospitalized.      Medications  Current Facility-Administered Medications   Medication Dose Route Frequency Provider Last Rate Last Admin    ARIPiprazole (ABILIFY) tablet 5 mg  5 mg Oral Daily Lakesha Obregon MD   5 mg at 08/15/21 0638    acetaminophen (TYLENOL) tablet 650 mg  650 mg Oral Q4H PRN Lakesha Obregon MD        ibuprofen (ADVIL;MOTRIN) tablet 400 mg  400 mg Oral Q6H PRN Lakesha Obregon MD        magnesium hydroxide (MILK OF MAGNESIA) 400 MG/5ML suspension 30 mL  30 mL Oral Daily PRN Lakesha Obregon MD        hydrOXYzine (VISTARIL) capsule 50 mg  50 mg Oral TID PRN Lakesha Obregon MD   50 mg at 08/15/21 2355    OLANZapine (ZYPREXA) tablet 5 mg  5 mg Oral Q4H PRN Lakesha Obregon MD   5 mg at 08/14/21 1237    Or    OLANZapine (ZYPREXA) injection 10 mg  10 mg Intramuscular Q4H PRN Lakesha Obregon MD        sterile water injection 2.1 mL  2.1 mL Intramuscular Q4H PRN Lakesha Obregon MD        traZODone (DESYREL) tablet 50 mg  50 mg Oral Nightly PRN Lakesha Obregon MD   50 mg at 08/15/21 2353    nicotine polacrilex (NICORETTE) gum 4 mg  4 mg Oral PRN Lakesha Obregon MD   4 mg at 08/14/21 1909    sertraline (ZOLOFT) tablet 50 mg  50 mg Oral Daily Lakesha Obregon MD   50 mg at 08/15/21 0854    traZODone (DESYREL) tablet 100 mg  100 mg Oral Nightly Lakesha Obregon MD   100 mg at 08/15/21 2148      ARIPiprazole  5 mg Oral Daily    sertraline  50 mg Oral Daily    traZODone  100 mg Oral Nightly      PRN Meds: acetaminophen, ibuprofen, magnesium hydroxide, hydrOXYzine, OLANZapine **OR** OLANZapine, sterile water, traZODone, nicotine polacrilex   Estimated length of stay: 3-5 days  Prognosis:  good   Criteria for Discharge:    Not suicidal, sleeping well, affect stable, depression improving, eating well, aftercare arranged. History and Physical Dictated  Spent at least 70 minutes with evaluation process and more than 50% of the time was spent obtaining history and planning treatment with the patient  Dx: Behavioral Services  Medicare Certification Upon Admission    I certify that this patient's inpatient psychiatric hospital admission is medically necessary for:   X (1) Treatment which could reasonably be expected to improve this patient's condition,      X (2) Or for diagnostic study;     AND    X (2) The inpatient psychiatric services are provided while the individual is under the care of a physician and are included in the individualized plan of care.     Estimated length of stay/service 3-5 days    Plan for post-hospital care outpt    Electronically signed by Jean Goodell, MD on 8/16/2021 at 6:33 AM

## 2021-08-16 NOTE — BH NOTE
Unit 6D Observation [address] [address]    Phone:  [phone]    Fax:  [phone]                                       After Visit Summary   12/19/2017    Jose Lira    MRN: [id number]           After Visit Summary Signature Page     I have received my discharge instructions, and my questions have been answered. I have discussed any challenges I see with this plan with the nurse or doctor.    ..........................................................................................................................................  Patient/Patient Representative Signature      ..........................................................................................................................................  Patient Representative Print Name and Relationship to Patient    ..................................................               ................................................  Date                                            Time    ..........................................................................................................................................  Reviewed by Signature/Title    ...................................................              ..............................................  Date                                                            Time           Purposeful Rounding     Patient Location: Day room     Patient willing to engage in conversation:  Yes     Presentation/behavior: Withdrawn, Controlled and Cooperative     Affect: Flat/blunted     Concerns reported: Poor sleep     PRN medications given: none     Environmental assessment: No safety hazards noted     Fall prevention interventions in place: Yellow non-skid socks on     Daily [place] Fall Risk Score: 57     Daily Dang Fall Risk Score: 0

## 2021-08-16 NOTE — GROUP NOTE
Group Therapy Note    Date: 8/16/2021    Group Start Time: 1000  Group End Time: 1050  Group Topic: Psychoeducation    [address]        Group Therapy Note    Attendees: 6         Patient's Goal: to learn and discuss communication styles of being assertive, passive and aggressive and that being assertive is the healthiest way to communicate. Pt's asked to apply to themselves. Notes: pt actively participated in group discussion and was able to apply to himself. Pt reported that he is passive and would like to work on being more assertive. Pt receptive to group discussion. Status After Intervention:  Improved    Participation Level:  Active Listener and Interactive    Participation Quality: Appropriate, Attentive, Sharing and Supportive      Speech:  normal      Thought Process/Content: Logical      Affective Functioning: Congruent      Mood: depressed      Level of consciousness:  Alert, Oriented x4 and Attentive      Response to Learning: Able to verbalize current knowledge/experience, Able to verbalize/acknowledge new learning and Progressing to goal      Endings: None Reported    Modes of Intervention: Education, Support, Socialization, Exploration and Clarifying      Discipline Responsible: /Counselor      Signature:  NINI Cunningham

## 2021-08-16 NOTE — FLOWSHEET NOTE
Group Therapy Note    Date: 8/16/2021  Start Time: 1300  End Time:  4287  Number of Participants: 6    Type of Group: [employer] [facility]    Notes:  Pt present for [employer]. Pt sat quietly throughout most of group, but did sing along during music. Participation Level:  Active Listener and Interactive    Participation Quality: Appropriate and Attentive      Speech:  hesitant      Affective Functioning: Congruent      Endings: None Reported    Modes of Intervention: Support, Socialization, Exploration and Media      Discipline Responsible: Chaplain Bette Wright       08/16/21 1403   Encounter Summary   Services provided to: Patient   Continue Visiting   (8/16 [employer] [facility])   Complexity of Encounter Moderate   Length of Encounter 45 minutes

## 2021-08-16 NOTE — GROUP NOTE
Group Therapy Note    Date: 8/16/2021    Group Start Time: 0097  Group End Time: 1200  Group Topic: [address]        Group Therapy Note    Attendees: 13       Patient's Goal:  Patient will complete worksheet on boundaries and will discuss how they apply to mental and emotional wellbeing. Notes:  Patient attended group. Completed the worksheet and discussed in group. Verbalized a basic understanding of boundaries and shared examples of poor and healthy boundaries from life. Received feedback and support from peers. Status After Intervention:  Improved    Participation Level:  Active Listener and Interactive    Participation Quality: Appropriate and Attentive    Speech:  normal    Thought Process/Content: Logical    Affective Functioning: Congruent    Mood: anxious, depressed     Level of consciousness:  Oriented x4    Response to Learning: Able to verbalize current knowledge/experience and Able to verbalize/acknowledge new learning    Endings: None Reported    Modes of Intervention: Education, Support, Socialization and Exploration    Discipline Responsible: /Counselor    Signature:  Jo Ann Reyes MA, Aram Mcgrath

## 2021-08-16 NOTE — FLOWSHEET NOTE
PRN Trazodone 50 mg requested/given for sleep at this time. Patient reports Trazodone 100 mg was not effective. PRN Vistaril requested/given for anxiety.

## 2021-08-16 NOTE — BH NOTE
Purposeful Rounding     Patient Location: Day room     Patient willing to engage in conversation: Yes     Presentation/behavior: Withdrawn, Controlled and Cooperative     Affect: Flat/blunted     Concerns reported: Poor sleep     PRN medications given: none     Environmental assessment: No safety hazards noted     Fall prevention interventions in place: Yellow non-skid socks on     Daily [place] Fall Risk Score: 57     Daily Dang Fall Risk Score: 0

## 2021-08-16 NOTE — BH NOTE
Purposeful Rounding    Patient Location: Day room    Patient willing to engage in conversation: Yes    Presentation/behavior: Withdrawn, Controlled and Cooperative    Affect: Flat/blunted    Concerns reported: Poor sleep    PRN medications given: none    Environmental assessment: No safety hazards noted    Fall prevention interventions in place: Yellow non-skid socks on    Daily [place] Fall Risk Score: 57    Daily Dang Fall Risk Score: 0

## 2021-08-16 NOTE — PLAN OF CARE
Problem: Depressive Behavior With or Without Suicide Precautions:  Goal: Able to verbalize and/or display a decrease in depressive symptoms  Description: Able to verbalize and/or display a decrease in depressive symptoms  Outcome: Ongoing  Goal: Absence of self-harm  Description: Absence of self-harm  Outcome: Ongoing   Rosita La was visible in the milieu, keeping to himself mostly as he was reading a book. He did attend evening group and reported that he is really feeling better today, \"like I  have rediscovered how I used to cope back when I was young, by reading, it really helps me and my anxiety. \"  He denied SI,HI or AVH and CFS. Denies c/o pain or discomfort. Accepted his routine bedtime medications and retired to his room after snack.

## 2021-08-16 NOTE — PLAN OF CARE
Problem: Depressive Behavior With or Without Suicide Precautions:  Goal: Able to verbalize and/or display a decrease in depressive symptoms  Description: Able to verbalize and/or display a decrease in depressive symptoms  8/16/2021 1124 by Serenity Light RN  Outcome: Ongoing   Patient awake and visible on unit this morning. He reports poor sleep-interrupted and this writer encouraged pt to discuss adjusting sleeping meds with doc. He denies any thoughts of harming self or others at this time and CFS while on unit. He reports having anxious, but states it is worse at night because of the quietness. Compliant with meds and cooperative with staff. Will continue to monitor for safety.

## 2021-08-16 NOTE — PROGRESS NOTES
Inpatient Occupational Therapy  Evaluation and Treatment    Unit:  [facility]  Date:  2021  Patient Name:    Angela Martinez  Admitting diagnosis:  Suicidal ideation [R45.851]  Depression, unspecified depression type [F32.9]  Admit Date:  2021  Precautions/Restrictions/WB Status/ Lines/ Wounds/ Oxygen:  Up as tolerated   Treatment Time:  14:11-14:54  Treatment Number:  1    Patient Goals for Therapy:  \" Learn to love myself and feel that when I do things my effort was good enough. \"      Discharge Recommendations:   []Home Independently  [x] Home with assist prn [] Home OT  []SNF  []ARU    DME needs for discharge:   NA     AM-PAC Score:  24     Home Health S4 Level: [x] NA   [] Level 1- Standard  []  Level 2- Social  [] Level 3- Safety  []  Level 4- Sick    ACLS:   Completed 2021  Mode 5.4  Engaging Abilities and Following Safety Precautions When the Person Can Engage in Self-directed Learning     DESCRIPTION:    14% Cognitive Assistance: The person may live alone and work in a job with a wide margin of error. 14% standby cognitive assistance is needed to anticipate hazards and prevent industrial accidents. Individual preferences for improving the appearance of activities can be honored. 2% Physical Assistance is needed for fine motor actions. HPI:  per chart note by Lane Ann MD; Date of Service:  2021  25 y/o wm brought in to ed for worsening depression and si. Pt came in stating that he is hearing voices telling him to kill himself. He stated that he has been hearing voices since he was young but the sx's have gotten worst recently. Pt also reports that he has been feeling dep, anhedonia, hopeless/helpless, dec energy, no motivation, disrupted sleep, isolated and no motivation. Pt stated that it has been bad year his brother , grandpa  and his grandmother is sick. He verbalized that he has been getting more depressed to the point of not going to his job.  Pt reports that he and his wife are having trouble and he feels that she uses the daughter since he is not bio father and keeps her away from him when she does not get what she wants from him. 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 Lives   [] alone  [x]with friend in [facility] or Grandmother's in [place] (to assist)    Preadmission Status / PLOF:  History of falls   []Yes  [x]No  Pt. Able to drive   [x]Yes  []No   Pt Fully independent for ADLs/IADLs. [x]Yes  []No    Pt. Required assistance from family for:  []Bathing []Dressing []Cooking []Cleaning  []Laundry  []Other :   Pt. Fully independent for transfers and gait and walked with: [x]No Device  []Walker   []Cane  Sleep Hygiene:  Varies; 2-8 hours; fragmented; difficulty with sleep onset  Income:  20-25 hours work a wk at [employer] Management:  Self; pt reports no difficulty. Leisure Interests:  Drive, listen to music, [place]  Medication Management:  Self; \"I'm not on any medications besides what they have been giving me here. \"  Health Management:  Pt. Reports that he dose not have a PCP, Psychiatrist or therapist.  Social Network:   Best friend, Mother, Jey Whitten. Stressors:  1. Relationship with Ex-Wife.  2.  Difficulty see daughter. 3. \"Not feeling like I'm good enough. \"    Coping Skills:   Smoke, drive, listen to music, reading    Pain  []Yes  [x]No  Rating:  Location:  Pain Medicine Status: [] Denies need  [] Pain med requested  [] RN notified. Cognition    A&Ox4, patient cooperative. Follows []1 step and [x] 3 step commands. Upper Extremity ROM:    WFL, pt able to perform all bed mobility, transfers, and gait without ROM limitation.     Upper Extremity Strength:    WFL, pt able to perform all bed mobility, transfers, and gait without strength limitation. Upper Extremity Sensation:    No apparent deficits. Upper Extremity Proprioception:    No apparent deficits. Skin Integrity:  Healing cut on L LE. Coordination and Tone:  WFL    Balance  Static Sitting:  [x] Good [] Fair [] Poor  Dynamic Sitting:  [x] Good [] Fair [] Poor  Static Standing: [x] Good [] Fair [] Poor  Dynamic Standing: [x] Good [] Fair [] Poor    Bed mobility:  independent  Supine to sit:  Sit to supine:  Scooting to head of bed:  Scooting in sitting:  Rolling:  Bridging:    Transfers:  independent  Sit to stand:  Stand to sit:  Bed/Chair to/from toilet:    Self Care:  independent  Toileting:  Grooming:  Dressing:    Exercise / Activities Initiated:   Pt. Educated on role of OT. Pt. Participated in:  Loyda Sofia, and coping skills. Assessment of Deficits:   Pt demonstrated decreased activity tolerance, decreased safety awareness, decreased coping skills and decreased ADL/IADL status. Pt. Limited during evaluation by decreased cognition. At end of evaluation, pt. In gathering room. Goal(s) : To be met in 3 Visits:  1). Pt. To complete ACLS. 2). Pt. To verbalize understanding of sleep hygiene education. To be met in 5 Visits:  1). Pt. To complete 1 SMART long term goal and 2 SMART short term goals with mod assist.  2). Pt. To verbalize 3 new coping skills. 3). Pt. To complete interest check list.    4). Pt. To verbalize understanding of 3 communication styles. 5). Pt. To complete wellness plan. 6). Pt. To complete a daily schedule of healthy activities/routines with mod assist.   7). Pt. To identify 2 memory strategies to take medications as prescribed. Rehabilitation Potential:  Good for goals listed above. Strengths for achieving goals include:  PLOF  Barriers to achieving goals include:  Decreased Cognition     Plan:   To be seen 2-5x/week while in acute care setting

## 2021-08-17 VITALS
OXYGEN SATURATION: 98 % | BODY MASS INDEX: 38.5 KG/M2 | DIASTOLIC BLOOD PRESSURE: 64 MMHG | TEMPERATURE: 97.3 F | HEART RATE: 83 BPM | RESPIRATION RATE: 18 BRPM | HEIGHT: 71 IN | WEIGHT: 275 LBS | SYSTOLIC BLOOD PRESSURE: 132 MMHG

## 2021-08-17 PROCEDURE — 6370000000 HC RX 637 (ALT 250 FOR IP): Performed by: PSYCHIATRY & NEUROLOGY

## 2021-08-17 PROCEDURE — 99239 HOSP IP/OBS DSCHRG MGMT >30: CPT | Performed by: PSYCHIATRY & NEUROLOGY

## 2021-08-17 PROCEDURE — 5130000000 HC BRIDGE APPOINTMENT

## 2021-08-17 RX ORDER — ARIPIPRAZOLE 5 MG/1
5 TABLET ORAL DAILY
Qty: 30 TABLET | Refills: 0 | Status: SHIPPED | OUTPATIENT
Start: 2021-08-18

## 2021-08-17 RX ORDER — TRAZODONE HYDROCHLORIDE 50 MG/1
50 TABLET ORAL NIGHTLY PRN
Qty: 20 TABLET | Refills: 0 | Status: SHIPPED | OUTPATIENT
Start: 2021-08-17 | End: 2021-09-06

## 2021-08-17 RX ADMIN — ARIPIPRAZOLE 5 MG: 10 TABLET ORAL at 09:09

## 2021-08-17 RX ADMIN — SERTRALINE HYDROCHLORIDE 50 MG: 50 TABLET ORAL at 09:08

## 2021-08-17 NOTE — GROUP NOTE
Group Therapy Note    Date: 8/16/2021    Group Start Time: 2015  Group End Time: 2045  Group Topic: Wrap-Up    [facility]        Group Therapy Note    Attendees: Goals and importance of goal setting discussed. Night time milieu activities discussed. Patient's Goal:  Go to groups, talk to dr about sleeping meds    Notes:  Successful     Status After Intervention:  Improved    Participation Level:  Active Listener and Interactive    Participation Quality: Appropriate and Attentive      Speech:  normal      Thought Process/Content: Logical  Linear      Affective Functioning: Congruent      Mood: anxious      Level of consciousness:  Alert and Oriented x4      Response to Learning: Progressing to goal      Endings: None Reported    Modes of Intervention: Support      Discipline Responsible: [email or web address]      Signature:  Luda [place]

## 2021-08-17 NOTE — BH NOTE
Bridge Appointment completed: Reviewed Discharge Instructions with patient. Patient verbalizes understanding and agreement with the discharge plan using the teachback method. Referral for Outpatient Tobacco Cessation Counseling, upon discharge (juanjo X if applicable and completed):    ( )  Hospital staff assisted patient to call Quit Line or faxed referral                                   during hospitalization                  ( )  Recognizing danger situations (included triggers and roadblocks), if not completed on admission                    ( )  Coping skills (new ways to manage stress, exercise, relaxation techniques, changing routine, distraction), if not completed on admission                                                           ( )  Basic information about quitting (benefits of quitting, techniques in how to quit, available resources, if not completed on admission  ( ) Referral for counseling faxed to Jon   ( ) Patient refused referral  (X) Patient refused counseling  ( ) Patient refused smoking cessation medication upon discharge    [facility]  Discharge Note    Pt discharged with followings belongings:   Dentures: None  Vision - Corrective Lenses: None  Hearing Aid: None  Jewelry: None  Body Piercings Removed: N/A  Clothing: Pants, Shirt, Other (Comment) (belt)  Were All Patient Medications Collected?: Not Applicable  Other Valuables: Cell phone   Valuables sent home with patient or returned to patient. Patient education on aftercare instructions: yes  Information faxed to [facility] by Isaias Leger RN  at 2:16 PM .Patient verbalize understanding of AVS:  yes.     Status EXAM upon discharge:  Status and Exam  Normal: Yes  Facial Expression: Brightened  Affect: Congruent  Level of Consciousness: Alert  Mood:Normal: Yes  Mood:  (euthymic)  Motor Activity:Normal: Yes  Motor Activity: Decreased  Interview Behavior: Cooperative  Preception: [place] to Person, Luca Kid to Time, [place] to Place, [place] to Situation  Attention:Normal: Yes  Attention: Unable to Concentrate  Thought Processes: Other(See comment) (WNL; linear)  Thought Content:Normal: Yes  Hallucinations: None  Delusions: No  Memory:Normal: Yes  Insight and Judgment: No  Insight and Judgment: Poor Judgment, Poor Insight  Present Suicidal Ideation: No  Present Homicidal Ideation: No      Metabolic Screening:    No results found for: LABA1C    No results found for: CHOL  No results found for: TRIG  No results found for: HDL  No components found for: LDLCAL  No results found for: Ana Archuleta RN

## 2021-08-17 NOTE — PLAN OF CARE
Problem: Depressive Behavior With or Without Suicide Precautions:  Goal: Able to verbalize and/or display a decrease in depressive symptoms  Description: Able to verbalize and/or display a decrease in depressive symptoms  8/17/2021 0023 by Cherylynn Moritz, RN  Outcome: Ongoing   Visible and social in the milieu this evening. Attended all of the evening groups. Mike Head states that he hopes to discharge tomorrow. He thinks he has learned a lot of skills while here. Mike Head denied suicidal and homicidal ideations this evening. He also denied hallucinations. Mike Head was compliant with his hs medications and went to sleep quickly after taking his hs Trazodone.

## 2021-08-17 NOTE — PROGRESS NOTES
Department of Psychiatry  Progress Note    Patient's chart was reviewed. Discussed with treatment team. Met with patient. SUBJECTIVE:      Reports feeling better since admission. Today, has been more active in the milieu and programming. Is future oriented and recognizes he has a strong support network outside the hospital.    No SI. No AH. Dicussed and processed his experience with the book at length. No side effects. Requested to be discharged today because he feels better but agreed to stay one more day to solidify his gains.      ROS:   Patient has new complaints: no  Sleeping adequately:  Yes   Appetite adequate: Yes  Engaged in programming: Yes    OBJECTIVE:  VITALS:  /64   Pulse 83   Temp 97.3 °F (36.3 °C) (Temporal)   Resp 18   Ht 5' 11\" (1.803 m)   Wt 275 lb (124.7 kg)   SpO2 98%   BMI 38.35 kg/m²     Mental Status Examination:    Appearance: improved grooming and hygiene  Behavior/Attitude toward examiner:  good eye contact  Speech: Normal rate, volume, amount  Mood:  \"better\"  Affect:  blunted     Thought processes:  Goal directed, linear, no SCOOBY or gross disorganization  Thought Content: no SI, no HI, no delusions voiced, no obsessions  Perceptions: no AVH  Attention: attention span and concentration were intact to interview   Abstraction: intact  Cognition:  Alert and oriented to person, place, time, and situation, recall intact  Insight: fair  Judgment: improved    Medication:  Scheduled:   ARIPiprazole  5 mg Oral Daily    sertraline  50 mg Oral Daily    traZODone  100 mg Oral Nightly        PRN:  acetaminophen, ibuprofen, magnesium hydroxide, hydrOXYzine, OLANZapine **OR** OLANZapine, sterile water, traZODone, nicotine polacrilex     ASSESSMENT AND PLAN:  Domiciled, , unemployed 31yo with a history of depressed mood who was brought to our ED by a friend with SI.     Principal Problem:    Depression  Active Problems:    Leukocytosis  Resolved Problems:    * No resolved hospital problems. *     1. Admitted to inpatient adult psychiatry for stabilization and treatment. 2.On admission, started Zoloft 50mg and Abilfiy 5mg daily for treatment of depression and AH. Ordered q15min checks for safety, programming, and prn medication for anxiety, agitation, and insomnia. 3.Internal medicine consult for admission. #Leucocytosis  - without apparent infection. Suspect reactive. No signs of infection. Follow-up with PCP. 4. ELOS 5-8 days. Voluntary. Target DC tomorrow of continues to make gains. Total time with patient was 35 minutes and more than 50 % of that time was spent counseling the patient on their symptoms, treatment, and expected goals.      Darshan Kang MD

## 2021-08-17 NOTE — SUICIDE SAFETY PLAN
SAFETY PLAN    A suicide Safety Plan is a document that supports someone when they are having thoughts of suicide. Warning Signs that indicate a suicidal crisis may be developing: What (situations, thoughts, feelings, body sensations, behaviors, etc.) do you experience that lets you know you are beginning to think about suicide? 1. Can't sit still  2. Pacing  3. Breathing heavy    Internal Coping Strategies:  What things can I do (relaxation techniques, hobbies, physical activities, etc.) to take my mind off my problems without contacting another person? 1. Music  2. Take a walk  3. Breathing exercises    People and social settings that provide distraction: Who can I call or where can I go to distract me? 1. Name: Jose Doyle  Phone: [phone]  2. Name: Dandre Gomez  Phone: [phone]   3. Place: The beach            4. Place: The park  5. Place: Walking in the woods    People whom I can ask for help: Who can I call when I need help - for example, friends, family, clergy, someone else? 1. Name: Jose Doyle                  2. Name: Dandre Gmoez    3. Name: Davian Stinson  Phone: [phone]    [facility] or [address] [place] [address] I can contact during a crisis:      Suicide Prevention Lifeline: 2-708-549-UBSD (2451)      Making the environment safe: How can I make my environment (house/apartment/living space) safer? For example, can I remove guns, medications, and other items? 1. Remove all bad items from home  2.  Take meds    Something worth living for: My daughter    Denies access to weapons at home

## 2021-08-17 NOTE — BH NOTE
Purposeful Rounding    Patient Location: Group room    Patient willing to engage in conversation: Yes    Presentation/behavior: Cooperative and Pleasant    Affect: Neutral/Euthymic(normal)    Concerns reported: none    PRN medications given: none    Environmental assessment: Room free from clutter and Clear path to bathroom     Fall prevention interventions in place: Yellow non-skid socks on    Daily [place] Fall Risk Score: 57    Daily Dang Fall Risk Score: 0

## 2021-08-17 NOTE — BH NOTE
Pt up on unit, attends group and ate breakfast on unit. States he slept poorly. Pleasant, cooperative with care. Denies all. States he feels ready for discharge today.
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
